# Patient Record
Sex: FEMALE | Race: WHITE | NOT HISPANIC OR LATINO | Employment: UNEMPLOYED | URBAN - METROPOLITAN AREA
[De-identification: names, ages, dates, MRNs, and addresses within clinical notes are randomized per-mention and may not be internally consistent; named-entity substitution may affect disease eponyms.]

---

## 2018-12-23 ENCOUNTER — APPOINTMENT (OUTPATIENT)
Dept: CARDIOLOGY | Facility: MEDICAL CENTER | Age: 48
End: 2018-12-23
Attending: INTERNAL MEDICINE

## 2018-12-23 ENCOUNTER — HOSPITAL ENCOUNTER (OUTPATIENT)
Facility: MEDICAL CENTER | Age: 48
End: 2018-12-24
Attending: EMERGENCY MEDICINE | Admitting: INTERNAL MEDICINE

## 2018-12-23 ENCOUNTER — APPOINTMENT (OUTPATIENT)
Dept: RADIOLOGY | Facility: MEDICAL CENTER | Age: 48
End: 2018-12-23
Attending: EMERGENCY MEDICINE

## 2018-12-23 DIAGNOSIS — I16.0 HYPERTENSIVE URGENCY: ICD-10-CM

## 2018-12-23 PROBLEM — G43.909 MIGRAINES: Status: ACTIVE | Noted: 2018-12-23

## 2018-12-23 LAB
ALBUMIN SERPL BCP-MCNC: 4.2 G/DL (ref 3.2–4.9)
ALBUMIN/GLOB SERPL: 1.3 G/DL
ALP SERPL-CCNC: 87 U/L (ref 30–99)
ALT SERPL-CCNC: 10 U/L (ref 2–50)
ANION GAP SERPL CALC-SCNC: 11 MMOL/L (ref 0–11.9)
APPEARANCE UR: CLEAR
APTT PPP: 24.9 SEC (ref 24.7–36)
AST SERPL-CCNC: 19 U/L (ref 12–45)
BACTERIA #/AREA URNS HPF: ABNORMAL /HPF
BASOPHILS # BLD AUTO: 0.4 % (ref 0–1.8)
BASOPHILS # BLD: 0.04 K/UL (ref 0–0.12)
BILIRUB SERPL-MCNC: 0.6 MG/DL (ref 0.1–1.5)
BILIRUB UR QL STRIP.AUTO: NEGATIVE
BUN SERPL-MCNC: 13 MG/DL (ref 8–22)
CALCIUM SERPL-MCNC: 9.2 MG/DL (ref 8.5–10.5)
CHLORIDE SERPL-SCNC: 108 MMOL/L (ref 96–112)
CO2 SERPL-SCNC: 21 MMOL/L (ref 20–33)
COLOR UR: YELLOW
CREAT SERPL-MCNC: 0.75 MG/DL (ref 0.5–1.4)
EOSINOPHIL # BLD AUTO: 0.11 K/UL (ref 0–0.51)
EOSINOPHIL NFR BLD: 1.1 % (ref 0–6.9)
EPI CELLS #/AREA URNS HPF: ABNORMAL /HPF
ERYTHROCYTE [DISTWIDTH] IN BLOOD BY AUTOMATED COUNT: 45.5 FL (ref 35.9–50)
GLOBULIN SER CALC-MCNC: 3.2 G/DL (ref 1.9–3.5)
GLUCOSE SERPL-MCNC: 102 MG/DL (ref 65–99)
GLUCOSE UR STRIP.AUTO-MCNC: NEGATIVE MG/DL
HCT VFR BLD AUTO: 47.6 % (ref 37–47)
HGB BLD-MCNC: 15.3 G/DL (ref 12–16)
HYALINE CASTS #/AREA URNS LPF: ABNORMAL /LPF
IMM GRANULOCYTES # BLD AUTO: 0.02 K/UL (ref 0–0.11)
IMM GRANULOCYTES NFR BLD AUTO: 0.2 % (ref 0–0.9)
INR PPP: 1.01 (ref 0.87–1.13)
KETONES UR STRIP.AUTO-MCNC: 15 MG/DL
LEUKOCYTE ESTERASE UR QL STRIP.AUTO: NEGATIVE
LV EJECT FRACT  99904: 60
LV EJECT FRACT MOD 2C 99903: 66.61
LV EJECT FRACT MOD 4C 99902: 54.45
LV EJECT FRACT MOD BP 99901: 61.62
LYMPHOCYTES # BLD AUTO: 1.48 K/UL (ref 1–4.8)
LYMPHOCYTES NFR BLD: 14.8 % (ref 22–41)
MAGNESIUM SERPL-MCNC: 2.2 MG/DL (ref 1.5–2.5)
MCH RBC QN AUTO: 31 PG (ref 27–33)
MCHC RBC AUTO-ENTMCNC: 32.1 G/DL (ref 33.6–35)
MCV RBC AUTO: 96.6 FL (ref 81.4–97.8)
MICRO URNS: ABNORMAL
MONOCYTES # BLD AUTO: 0.53 K/UL (ref 0–0.85)
MONOCYTES NFR BLD AUTO: 5.3 % (ref 0–13.4)
NEUTROPHILS # BLD AUTO: 7.79 K/UL (ref 2–7.15)
NEUTROPHILS NFR BLD: 78.2 % (ref 44–72)
NITRITE UR QL STRIP.AUTO: POSITIVE
NRBC # BLD AUTO: 0 K/UL
NRBC BLD-RTO: 0 /100 WBC
PH UR STRIP.AUTO: 5.5 [PH]
PLATELET # BLD AUTO: 324 K/UL (ref 164–446)
PMV BLD AUTO: 9.6 FL (ref 9–12.9)
POTASSIUM SERPL-SCNC: 3.9 MMOL/L (ref 3.6–5.5)
PROT SERPL-MCNC: 7.4 G/DL (ref 6–8.2)
PROT UR QL STRIP: NEGATIVE MG/DL
PROTHROMBIN TIME: 13.4 SEC (ref 12–14.6)
RBC # BLD AUTO: 4.93 M/UL (ref 4.2–5.4)
RBC # URNS HPF: ABNORMAL /HPF
RBC UR QL AUTO: ABNORMAL
SODIUM SERPL-SCNC: 140 MMOL/L (ref 135–145)
SP GR UR STRIP.AUTO: >=1.045
TROPONIN I SERPL-MCNC: <0.01 NG/ML (ref 0–0.04)
UROBILINOGEN UR STRIP.AUTO-MCNC: 1 MG/DL
WBC # BLD AUTO: 10 K/UL (ref 4.8–10.8)
WBC #/AREA URNS HPF: ABNORMAL /HPF

## 2018-12-23 PROCEDURE — 81001 URINALYSIS AUTO W/SCOPE: CPT

## 2018-12-23 PROCEDURE — 93306 TTE W/DOPPLER COMPLETE: CPT

## 2018-12-23 PROCEDURE — 83735 ASSAY OF MAGNESIUM: CPT

## 2018-12-23 PROCEDURE — 85025 COMPLETE CBC W/AUTO DIFF WBC: CPT

## 2018-12-23 PROCEDURE — 87186 SC STD MICRODIL/AGAR DIL: CPT

## 2018-12-23 PROCEDURE — 70496 CT ANGIOGRAPHY HEAD: CPT

## 2018-12-23 PROCEDURE — 700111 HCHG RX REV CODE 636 W/ 250 OVERRIDE (IP): Performed by: INTERNAL MEDICINE

## 2018-12-23 PROCEDURE — 87086 URINE CULTURE/COLONY COUNT: CPT

## 2018-12-23 PROCEDURE — 700111 HCHG RX REV CODE 636 W/ 250 OVERRIDE (IP): Performed by: EMERGENCY MEDICINE

## 2018-12-23 PROCEDURE — G0378 HOSPITAL OBSERVATION PER HR: HCPCS

## 2018-12-23 PROCEDURE — 85610 PROTHROMBIN TIME: CPT

## 2018-12-23 PROCEDURE — 93306 TTE W/DOPPLER COMPLETE: CPT | Mod: 26 | Performed by: INTERNAL MEDICINE

## 2018-12-23 PROCEDURE — 96365 THER/PROPH/DIAG IV INF INIT: CPT

## 2018-12-23 PROCEDURE — 99285 EMERGENCY DEPT VISIT HI MDM: CPT

## 2018-12-23 PROCEDURE — 99219 PR INITIAL OBSERVATION CARE,LEVL II: CPT | Performed by: INTERNAL MEDICINE

## 2018-12-23 PROCEDURE — 85730 THROMBOPLASTIN TIME PARTIAL: CPT

## 2018-12-23 PROCEDURE — 700105 HCHG RX REV CODE 258: Performed by: HOSPITALIST

## 2018-12-23 PROCEDURE — 700102 HCHG RX REV CODE 250 W/ 637 OVERRIDE(OP): Performed by: INTERNAL MEDICINE

## 2018-12-23 PROCEDURE — 84484 ASSAY OF TROPONIN QUANT: CPT

## 2018-12-23 PROCEDURE — 87077 CULTURE AEROBIC IDENTIFY: CPT

## 2018-12-23 PROCEDURE — 93005 ELECTROCARDIOGRAM TRACING: CPT | Performed by: EMERGENCY MEDICINE

## 2018-12-23 PROCEDURE — 700111 HCHG RX REV CODE 636 W/ 250 OVERRIDE (IP): Performed by: HOSPITALIST

## 2018-12-23 PROCEDURE — 96375 TX/PRO/DX INJ NEW DRUG ADDON: CPT

## 2018-12-23 PROCEDURE — 700117 HCHG RX CONTRAST REV CODE 255: Performed by: EMERGENCY MEDICINE

## 2018-12-23 PROCEDURE — 80053 COMPREHEN METABOLIC PANEL: CPT

## 2018-12-23 PROCEDURE — A9270 NON-COVERED ITEM OR SERVICE: HCPCS | Performed by: INTERNAL MEDICINE

## 2018-12-23 RX ORDER — KETOROLAC TROMETHAMINE 30 MG/ML
30 INJECTION, SOLUTION INTRAMUSCULAR; INTRAVENOUS EVERY 6 HOURS PRN
Status: DISCONTINUED | OUTPATIENT
Start: 2018-12-23 | End: 2018-12-24 | Stop reason: HOSPADM

## 2018-12-23 RX ORDER — ENALAPRILAT 1.25 MG/ML
1.25 INJECTION INTRAVENOUS EVERY 6 HOURS PRN
Status: DISCONTINUED | OUTPATIENT
Start: 2018-12-23 | End: 2018-12-24 | Stop reason: HOSPADM

## 2018-12-23 RX ORDER — BISACODYL 10 MG
10 SUPPOSITORY, RECTAL RECTAL
Status: DISCONTINUED | OUTPATIENT
Start: 2018-12-23 | End: 2018-12-24 | Stop reason: HOSPADM

## 2018-12-23 RX ORDER — AMOXICILLIN 250 MG
2 CAPSULE ORAL 2 TIMES DAILY
Status: DISCONTINUED | OUTPATIENT
Start: 2018-12-23 | End: 2018-12-24 | Stop reason: HOSPADM

## 2018-12-23 RX ORDER — PROMETHAZINE HYDROCHLORIDE 12.5 MG/1
12.5-25 SUPPOSITORY RECTAL EVERY 4 HOURS PRN
Status: DISCONTINUED | OUTPATIENT
Start: 2018-12-23 | End: 2018-12-24 | Stop reason: HOSPADM

## 2018-12-23 RX ORDER — HYDRALAZINE HYDROCHLORIDE 20 MG/ML
10 INJECTION INTRAMUSCULAR; INTRAVENOUS EVERY 4 HOURS PRN
Status: DISCONTINUED | OUTPATIENT
Start: 2018-12-23 | End: 2018-12-24 | Stop reason: HOSPADM

## 2018-12-23 RX ORDER — CLONIDINE HYDROCHLORIDE 0.1 MG/1
0.1 TABLET ORAL EVERY 6 HOURS PRN
Status: DISCONTINUED | OUTPATIENT
Start: 2018-12-23 | End: 2018-12-24 | Stop reason: HOSPADM

## 2018-12-23 RX ORDER — POLYETHYLENE GLYCOL 3350 17 G/17G
1 POWDER, FOR SOLUTION ORAL
Status: DISCONTINUED | OUTPATIENT
Start: 2018-12-23 | End: 2018-12-24 | Stop reason: HOSPADM

## 2018-12-23 RX ORDER — ALPRAZOLAM 0.5 MG/1
.25-.5 TABLET ORAL PRN
Status: SHIPPED | COMMUNITY
End: 2023-07-09

## 2018-12-23 RX ORDER — ONDANSETRON 4 MG/1
4 TABLET, ORALLY DISINTEGRATING ORAL EVERY 4 HOURS PRN
Status: DISCONTINUED | OUTPATIENT
Start: 2018-12-23 | End: 2018-12-23

## 2018-12-23 RX ORDER — ONDANSETRON 2 MG/ML
4 INJECTION INTRAMUSCULAR; INTRAVENOUS EVERY 4 HOURS PRN
Status: DISCONTINUED | OUTPATIENT
Start: 2018-12-23 | End: 2018-12-23

## 2018-12-23 RX ORDER — PROMETHAZINE HYDROCHLORIDE 25 MG/1
12.5-25 TABLET ORAL EVERY 4 HOURS PRN
Status: DISCONTINUED | OUTPATIENT
Start: 2018-12-23 | End: 2018-12-24 | Stop reason: HOSPADM

## 2018-12-23 RX ORDER — ACETAMINOPHEN 325 MG/1
650 TABLET ORAL EVERY 6 HOURS PRN
Status: DISCONTINUED | OUTPATIENT
Start: 2018-12-23 | End: 2018-12-24 | Stop reason: HOSPADM

## 2018-12-23 RX ORDER — LISINOPRIL 10 MG/1
10 TABLET ORAL
Status: DISCONTINUED | OUTPATIENT
Start: 2018-12-23 | End: 2018-12-24 | Stop reason: HOSPADM

## 2018-12-23 RX ADMIN — IOHEXOL 100 ML: 350 INJECTION, SOLUTION INTRAVENOUS at 15:47

## 2018-12-23 RX ADMIN — CEFTRIAXONE SODIUM 1 G: 1 INJECTION, POWDER, FOR SOLUTION INTRAMUSCULAR; INTRAVENOUS at 22:45

## 2018-12-23 RX ADMIN — LISINOPRIL 10 MG: 10 TABLET ORAL at 16:41

## 2018-12-23 RX ADMIN — PROCHLORPERAZINE EDISYLATE 10 MG: 5 INJECTION INTRAMUSCULAR; INTRAVENOUS at 14:00

## 2018-12-23 RX ADMIN — ENALAPRILAT 1.25 MG: 1.25 INJECTION INTRAVENOUS at 16:18

## 2018-12-23 RX ADMIN — HYDRALAZINE HYDROCHLORIDE 10 MG: 20 INJECTION INTRAMUSCULAR; INTRAVENOUS at 17:48

## 2018-12-23 RX ADMIN — KETOROLAC TROMETHAMINE 30 MG: 30 INJECTION, SOLUTION INTRAMUSCULAR at 17:06

## 2018-12-23 ASSESSMENT — LIFESTYLE VARIABLES
AVERAGE NUMBER OF DAYS PER WEEK YOU HAVE A DRINK CONTAINING ALCOHOL: 7
HOW MANY TIMES IN THE PAST YEAR HAVE YOU HAD 5 OR MORE DRINKS IN A DAY: 0
TOTAL SCORE: 0
HAVE YOU EVER FELT YOU SHOULD CUT DOWN ON YOUR DRINKING: NO
ALCOHOL_USE: YES
TOTAL SCORE: 0
TOTAL SCORE: 0
CONSUMPTION TOTAL: POSITIVE
EVER HAD A DRINK FIRST THING IN THE MORNING TO STEADY YOUR NERVES TO GET RID OF A HANGOVER: NO
EVER FELT BAD OR GUILTY ABOUT YOUR DRINKING: NO
EVER_SMOKED: YES
ON A TYPICAL DAY WHEN YOU DRINK ALCOHOL HOW MANY DRINKS DO YOU HAVE: 3
HAVE PEOPLE ANNOYED YOU BY CRITICIZING YOUR DRINKING: NO

## 2018-12-23 ASSESSMENT — PAIN SCALES - GENERAL
PAINLEVEL_OUTOF10: 5
PAINLEVEL_OUTOF10: 0
PAINLEVEL_OUTOF10: 9
PAINLEVEL_OUTOF10: 0

## 2018-12-23 ASSESSMENT — PATIENT HEALTH QUESTIONNAIRE - PHQ9
1. LITTLE INTEREST OR PLEASURE IN DOING THINGS: NOT AT ALL
2. FEELING DOWN, DEPRESSED, IRRITABLE, OR HOPELESS: NOT AT ALL
SUM OF ALL RESPONSES TO PHQ9 QUESTIONS 1 AND 2: 0

## 2018-12-23 NOTE — ED TRIAGE NOTES
"Chief Complaint   Patient presents with   • Headache     since last night, getting worse today   • N/V     today      Pt ambulatory to triage for above. PERRLA. Hx of migraines. BP noted.   Pt returned to [facility]. Educated on triage process and to inform staff of any changes.   BP (!) 195/100   Pulse 71   Temp 36.4 °C (97.5 °F) (Temporal)   Resp 16   Ht 1.549 m (5' 1\")   Wt 56.1 kg (123 lb 10.9 oz)   LMP 09/21/2015 (Approximate)   SpO2 99%   BMI 23.37 kg/m²     "

## 2018-12-23 NOTE — ED PROVIDER NOTES
ED Provider Note    Scribed for Leeann Padron M.D. by Medardo Patrick. 12/23/2018, 1:29 PM.    Primary care provider: Yeyo Bravo M.D.  Means of arrival: Walk in  History obtained from: Patient  History limited by: None    CHIEF COMPLAINT  Chief Complaint   Patient presents with   • Headache     since last night, getting worse today   • N/V     today        HPI  Yolie Elliott is a 48 y.o. female who presents to the Emergency Department for evaluation of headache onset last night per RN note, that has progressively worsened2. The patient endorses emesis, numbness of the fingers, palpitations, and lightheadedness, but denies any confusion, difficulty moving extremities, fever, chest pain, shortness of breath, dysuria, and back pain. The patient reports that she normally develops a bad headache once a month and she will wake up with the headaches. This headache is similar to when she develops a bad headache normally. She has never undergone any head imaging or been diagnosed with migraines. She has also not undergone blood work recently. The patient reports that she took her blood pressure over the past three days and it was consistently around 180/105, but today she notes that her systolic pressure was 209. She was previously evaluated for hypertension, but she reports that her MD noted that her high blood pressure was most likely secondary to anxiety. She also confirms a current bladder infection, that she reports she has had for over a year. The patient denies any history of aneurysm.    REVIEW OF SYSTEMS  Pertinent positives include headache, emesis, numbness of the fingers, palpitations, and lightheadedness. Pertinent negatives include no confusion, difficulty moving extremities, fever, chest pain, shortness of breath, dysuria, and back pain. All other systems reviewed and negative.     PAST MEDICAL HISTORY   has a past medical history of Arrhythmia; Breath shortness; Dental disorder;  "Heart burn; Hypertension; Inguinal hernia; Migraines; Other specified symptom associated with female genital organs; Ovarian cyst rupture; and Urinary bladder disorder.    SURGICAL HISTORY   has a past surgical history that includes pelviscopy (2/15/2011); dilation and curettage (2/15/2011); other abdominal surgery (2008); other; and abdominal hysterectomy total (N/A, 10/12/2015).    SOCIAL HISTORY  Social History   Substance Use Topics   • Smoking status: Former Smoker     Packs/day: 1.00     Years: 15.00     Types: Cigarettes   • Smokeless tobacco: Never Used      Comment: pack a day   • Alcohol use Yes      Comment: a couple a day      History   Drug Use No       FAMILY HISTORY  History reviewed. No pertinent family history.    CURRENT MEDICATIONS  Home Medications     Reviewed by Trav White R.N. (Registered Nurse) on 12/23/18 at 125wiMAN  Med List Status: Partial   Medication Last Dose Status   ALPRAZolam (XANAX PO)  Active   FIBER SELECT GUMMIES Chew Tab  Active   ibuprofen (ADVIL) 200 MG Tab  Active   ibuprofen (MOTRIN) 600 MG Tab  Active   oxycodone-acetaminophen (PERCOCET) 5-325 MG Tab  Active                ALLERGIES  No Known Allergies    PHYSICAL EXAM  VITAL SIGNS: BP (!) 195/100   Pulse 71   Temp 36.4 °C (97.5 °F) (Temporal)   Resp 16   Ht 1.549 m (5' 1\")   Wt 56.1 kg (123 lb 10.9 oz)   LMP 09/21/2015 (Approximate)   SpO2 99%   BMI 23.37 kg/m²     Constitutional:  Lying in bed with a sweatshirt over her eyes , able to answer questions  HENT: Nose is normal in appearance without rhinorrhea, external ears are normal,  moist mucous membranes  Eyes: Anicteric,  pupils are equal round and reactive, there is no conjunctival drainage or pallor   Neck: The trachea is midline, there is no obvious mass or meningeal signs  Cardiovascular: Equal radial pulsation, regular rate and rhythm without murmurs gallops or rubs  Thorax & Lungs: Respiratory rate and effort are normal. There is normal chest excursion " with respiration. No wheezes rhonchi or rales noted.  Abdomen: Abdomen is normal in appearance  :  No CVA tenderness to palpation  Musculoskeletal: No deformities noted in all 4 extremities. Actively moves all 4 extremities  Skin: Visualized skin is warm, no erythema, no rash.  Neurologic:  Cranial nerves II through XII are intact there is no focal abnormality noted.  Psychiatric: Normal mood and mentation    DIAGNOSTIC STUDIES / PROCEDURES    LABS  Results for orders placed or performed during the hospital encounter of 12/23/18   CBC WITH DIFFERENTIAL   Result Value Ref Range    WBC 10.0 4.8 - 10.8 K/uL    RBC 4.93 4.20 - 5.40 M/uL    Hemoglobin 15.3 12.0 - 16.0 g/dL    Hematocrit 47.6 (H) 37.0 - 47.0 %    MCV 96.6 81.4 - 97.8 fL    MCH 31.0 27.0 - 33.0 pg    MCHC 32.1 (L) 33.6 - 35.0 g/dL    RDW 45.5 35.9 - 50.0 fL    Platelet Count 324 164 - 446 K/uL    MPV 9.6 9.0 - 12.9 fL    Neutrophils-Polys 78.20 (H) 44.00 - 72.00 %    Lymphocytes 14.80 (L) 22.00 - 41.00 %    Monocytes 5.30 0.00 - 13.40 %    Eosinophils 1.10 0.00 - 6.90 %    Basophils 0.40 0.00 - 1.80 %    Immature Granulocytes 0.20 0.00 - 0.90 %    Nucleated RBC 0.00 /100 WBC    Neutrophils (Absolute) 7.79 (H) 2.00 - 7.15 K/uL    Lymphs (Absolute) 1.48 1.00 - 4.80 K/uL    Monos (Absolute) 0.53 0.00 - 0.85 K/uL    Eos (Absolute) 0.11 0.00 - 0.51 K/uL    Baso (Absolute) 0.04 0.00 - 0.12 K/uL    Immature Granulocytes (abs) 0.02 0.00 - 0.11 K/uL    NRBC (Absolute) 0.00 K/uL   COMP METABOLIC PANEL   Result Value Ref Range    Sodium 140 135 - 145 mmol/L    Potassium 3.9 3.6 - 5.5 mmol/L    Chloride 108 96 - 112 mmol/L    Co2 21 20 - 33 mmol/L    Anion Gap 11.0 0.0 - 11.9    Glucose 102 (H) 65 - 99 mg/dL    Bun 13 8 - 22 mg/dL    Creatinine 0.75 0.50 - 1.40 mg/dL    Calcium 9.2 8.5 - 10.5 mg/dL    AST(SGOT) 19 12 - 45 U/L    ALT(SGPT) 10 2 - 50 U/L    Alkaline Phosphatase 87 30 - 99 U/L    Total Bilirubin 0.6 0.1 - 1.5 mg/dL    Albumin 4.2 3.2 - 4.9 g/dL     Total Protein 7.4 6.0 - 8.2 g/dL    Globulin 3.2 1.9 - 3.5 g/dL    A-G Ratio 1.3 g/dL   TROPONIN   Result Value Ref Range    Troponin I <0.01 0.00 - 0.04 ng/mL   PROTHROMBIN TIME   Result Value Ref Range    PT 13.4 12.0 - 14.6 sec    INR 1.01 0.87 - 1.13   APTT   Result Value Ref Range    APTT 24.9 24.7 - 36.0 sec   ESTIMATED GFR   Result Value Ref Range    GFR If African American >60 >60 mL/min/1.73 m 2    GFR If Non African American >60 >60 mL/min/1.73 m 2   EKG   Result Value Ref Range    Report       [facility] Emergency Dept.    Test Date:  2018  Pt Name:    JOSUE PATTEN                Department: ER  MRN:        [id number]                      Room:       [facility]  Gender:     Female                       Technician: [id number]  :        1970                   Requested By:JENNIFER MCGOVERN  Order #:    [id number]                    Reading MD:    Measurements  Intervals                                Axis  Rate:       60                           P:          64  WY:         152                          QRS:        -2  QRSD:       88                           T:          40  QT:         516  QTc:        516    Interpretive Statements  SINUS RHYTHM  CONSIDER LEFT VENTRICULAR HYPERTROPHY  PROLONGED QT INTERVAL  Compared to ECG 10/05/2015 09:35:06  Prolonged QT interval now present       All labs reviewed by me.    EKG  I interpreted this EKG myself.  This is a 12-lead study.  The rhythm is sinus with a rate of 60. QRS of 88, LVH  appreciated. There are no ST segment nor T wave abnormalities.  Interpretation: No ST segment elevation myocardial infarction or arrhythmia. LVH new from prior EKG in .    RADIOLOGY  CT-CTA HEAD WITH & W/O-POST PROCESS   Final Result         1. No hemodynamically significant narrowing of the major intracranial vessels.        The radiologist's interpretation of all radiological studies have been reviewed by me.    COURSE & MEDICAL DECISION MAKING  Nursing  "notes and vital signs were reviewed. (See chart for details)  The patient's  records were reviewed, history was obtained from the patient.    The patient presents with headache, and the differential diagnosis includes but is not limited to acute headache that may be due to her chronic migraine, CT-CTA will be ordered to rule out intracranial process, mass and subarachnoid hemmorhage.       1:29 PM Initial orders in the Emergency Department included CT-CTA head, CBC, CMP, Troponin, Prothrombin time, APTT, UA, and EKG and initial treatment in the Emergency Department included Compazine 10 mg. I would like a CT with contrast of the brain to evaluate. She will also receive pain medications and blood pressure screening tests.    ED testing reveals LVH otherwise negative work-up    4:01 PM I reevaluated the patient and she was found to have an improved headache after receiving Compazine. I informed the patient that there CT and blood work appear to be normal, however, her EKG demonstrates Hypertrophy. She will need to be admitted for evaluation. The patient understands and agrees to admission.    BP (!) 195/100   Pulse 69   Temp 36.4 °C (97.5 °F) (Temporal)   Resp 14   Ht 1.549 m (5' 1\")   Wt 56.1 kg (123 lb 10.9 oz)   LMP 09/21/2015 (Approximate)   SpO2 99%   BMI 23.37 kg/m²     3:58 PM Hospitalist paged.    4:02 PM Dr. Dunham, Hospitalist, consulted and agrees to admit the patient.    DISPOSITION:  Patient will be admitted to Dr. Dunham in guarded condition.    FINAL IMPRESSION  1. Hypertensive urgency         Medardo FERREIRA ([place]), am scribing for, and in the presence of, Leeann Padron M.D.     Electronically signed by: Medardo Patrick ([email or web address]), 12/23//2018     Leeann FERREIRA M.D. personally performed the services described in this documentation, as scribed by Medardo Patrick in my presence, and it is both accurate and complete. C    The note accurately reflects work and decisions made by me. "  Leeann Padron  12/23/2018  6:19 PM

## 2018-12-23 NOTE — ED NOTES
Pt reports headache starting behind her eyes.  Pt reports HTN at home with SBP around 210 this morning at home.  Pt reports HTN at her PCP office who thought it was anxiety related and did not f/u on.  Pt reports N/V today and photophobia.

## 2018-12-24 ENCOUNTER — PATIENT OUTREACH (OUTPATIENT)
Dept: HEALTH INFORMATION MANAGEMENT | Facility: OTHER | Age: 48
End: 2018-12-24

## 2018-12-24 VITALS
HEIGHT: 61 IN | SYSTOLIC BLOOD PRESSURE: 158 MMHG | BODY MASS INDEX: 23.35 KG/M2 | WEIGHT: 123.68 LBS | HEART RATE: 76 BPM | RESPIRATION RATE: 16 BRPM | DIASTOLIC BLOOD PRESSURE: 80 MMHG | OXYGEN SATURATION: 97 % | TEMPERATURE: 99.1 F

## 2018-12-24 PROBLEM — I10 ESSENTIAL HYPERTENSION: Status: ACTIVE | Noted: 2018-12-24

## 2018-12-24 PROBLEM — E87.6 HYPOKALEMIA: Status: ACTIVE | Noted: 2018-12-24

## 2018-12-24 PROBLEM — N39.0 UTI (URINARY TRACT INFECTION): Status: ACTIVE | Noted: 2018-12-24

## 2018-12-24 PROBLEM — I16.0 HYPERTENSIVE URGENCY: Status: RESOLVED | Noted: 2018-12-23 | Resolved: 2018-12-24

## 2018-12-24 LAB
ANION GAP SERPL CALC-SCNC: 10 MMOL/L (ref 0–11.9)
BASOPHILS # BLD AUTO: 0.4 % (ref 0–1.8)
BASOPHILS # BLD: 0.04 K/UL (ref 0–0.12)
BUN SERPL-MCNC: 14 MG/DL (ref 8–22)
CALCIUM SERPL-MCNC: 8.9 MG/DL (ref 8.5–10.5)
CHLORIDE SERPL-SCNC: 105 MMOL/L (ref 96–112)
CO2 SERPL-SCNC: 20 MMOL/L (ref 20–33)
CREAT SERPL-MCNC: 0.75 MG/DL (ref 0.5–1.4)
EOSINOPHIL # BLD AUTO: 0.18 K/UL (ref 0–0.51)
EOSINOPHIL NFR BLD: 1.7 % (ref 0–6.9)
ERYTHROCYTE [DISTWIDTH] IN BLOOD BY AUTOMATED COUNT: 44.6 FL (ref 35.9–50)
GLUCOSE SERPL-MCNC: 104 MG/DL (ref 65–99)
HCT VFR BLD AUTO: 43.4 % (ref 37–47)
HGB BLD-MCNC: 14.1 G/DL (ref 12–16)
IMM GRANULOCYTES # BLD AUTO: 0.03 K/UL (ref 0–0.11)
IMM GRANULOCYTES NFR BLD AUTO: 0.3 % (ref 0–0.9)
LYMPHOCYTES # BLD AUTO: 2.64 K/UL (ref 1–4.8)
LYMPHOCYTES NFR BLD: 24.4 % (ref 22–41)
MCH RBC QN AUTO: 30.8 PG (ref 27–33)
MCHC RBC AUTO-ENTMCNC: 32.5 G/DL (ref 33.6–35)
MCV RBC AUTO: 94.8 FL (ref 81.4–97.8)
MONOCYTES # BLD AUTO: 0.9 K/UL (ref 0–0.85)
MONOCYTES NFR BLD AUTO: 8.3 % (ref 0–13.4)
NEUTROPHILS # BLD AUTO: 7.01 K/UL (ref 2–7.15)
NEUTROPHILS NFR BLD: 64.9 % (ref 44–72)
NRBC # BLD AUTO: 0 K/UL
NRBC BLD-RTO: 0 /100 WBC
PLATELET # BLD AUTO: 322 K/UL (ref 164–446)
PMV BLD AUTO: 9.8 FL (ref 9–12.9)
POTASSIUM SERPL-SCNC: 3.4 MMOL/L (ref 3.6–5.5)
RBC # BLD AUTO: 4.58 M/UL (ref 4.2–5.4)
SODIUM SERPL-SCNC: 135 MMOL/L (ref 135–145)
WBC # BLD AUTO: 10.8 K/UL (ref 4.8–10.8)

## 2018-12-24 PROCEDURE — 96376 TX/PRO/DX INJ SAME DRUG ADON: CPT

## 2018-12-24 PROCEDURE — 80048 BASIC METABOLIC PNL TOTAL CA: CPT

## 2018-12-24 PROCEDURE — 85025 COMPLETE CBC W/AUTO DIFF WBC: CPT

## 2018-12-24 PROCEDURE — G0378 HOSPITAL OBSERVATION PER HR: HCPCS

## 2018-12-24 PROCEDURE — 700111 HCHG RX REV CODE 636 W/ 250 OVERRIDE (IP): Performed by: INTERNAL MEDICINE

## 2018-12-24 PROCEDURE — 99217 PR OBSERVATION CARE DISCHARGE: CPT | Performed by: INTERNAL MEDICINE

## 2018-12-24 PROCEDURE — A9270 NON-COVERED ITEM OR SERVICE: HCPCS | Performed by: INTERNAL MEDICINE

## 2018-12-24 PROCEDURE — 700102 HCHG RX REV CODE 250 W/ 637 OVERRIDE(OP): Performed by: INTERNAL MEDICINE

## 2018-12-24 RX ORDER — CEFDINIR 300 MG/1
300 CAPSULE ORAL 2 TIMES DAILY
Qty: 6 CAP | Refills: 0 | Status: SHIPPED | OUTPATIENT
Start: 2018-12-24 | End: 2018-12-27

## 2018-12-24 RX ORDER — LISINOPRIL 10 MG/1
10 TABLET ORAL DAILY
Qty: 30 TAB | Refills: 1 | Status: SHIPPED | OUTPATIENT
Start: 2018-12-25 | End: 2022-02-17 | Stop reason: CLARIF

## 2018-12-24 RX ORDER — POTASSIUM CHLORIDE 20 MEQ/1
40 TABLET, EXTENDED RELEASE ORAL ONCE
Status: COMPLETED | OUTPATIENT
Start: 2018-12-24 | End: 2018-12-24

## 2018-12-24 RX ADMIN — KETOROLAC TROMETHAMINE 30 MG: 30 INJECTION, SOLUTION INTRAMUSCULAR at 08:05

## 2018-12-24 RX ADMIN — KETOROLAC TROMETHAMINE 30 MG: 30 INJECTION, SOLUTION INTRAMUSCULAR at 01:04

## 2018-12-24 RX ADMIN — POTASSIUM CHLORIDE 40 MEQ: 1500 TABLET, EXTENDED RELEASE ORAL at 08:05

## 2018-12-24 RX ADMIN — ACETAMINOPHEN 650 MG: 325 TABLET, FILM COATED ORAL at 05:26

## 2018-12-24 RX ADMIN — LISINOPRIL 10 MG: 10 TABLET ORAL at 05:24

## 2018-12-24 ASSESSMENT — PATIENT HEALTH QUESTIONNAIRE - PHQ9
2. FEELING DOWN, DEPRESSED, IRRITABLE, OR HOPELESS: NOT AT ALL
SUM OF ALL RESPONSES TO PHQ9 QUESTIONS 1 AND 2: 0
1. LITTLE INTEREST OR PLEASURE IN DOING THINGS: NOT AT ALL

## 2018-12-24 ASSESSMENT — COPD QUESTIONNAIRES
COPD SCREENING SCORE: 3
DO YOU EVER COUGH UP ANY MUCUS OR PHLEGM?: NO/ONLY WITH OCCASIONAL COLDS OR INFECTIONS
DURING THE PAST 4 WEEKS HOW MUCH DID YOU FEEL SHORT OF BREATH: SOME OF THE TIME
HAVE YOU SMOKED AT LEAST 100 CIGARETTES IN YOUR ENTIRE LIFE: YES

## 2018-12-24 ASSESSMENT — PAIN SCALES - GENERAL
PAINLEVEL_OUTOF10: 0
PAINLEVEL_OUTOF10: 0
PAINLEVEL_OUTOF10: 6
PAINLEVEL_OUTOF10: 3
PAINLEVEL_OUTOF10: 4

## 2018-12-24 ASSESSMENT — LIFESTYLE VARIABLES: EVER_SMOKED: YES

## 2018-12-24 NOTE — PROGRESS NOTES
Ambulated to bathroom with steady gait. Denies pain med needs right now. , HR 80-90's. Eating dinner and tolerating well. Plan of care reviewed including pending echo results, labs and vital frequency. Verbalized understanding and agrees. Able to make needs known.

## 2018-12-24 NOTE — DISCHARGE INSTRUCTIONS
Discharge Instructions    Discharged to home by car with relative. Discharged via walking, hospital escort: Yes.  Special equipment needed: Not Applicable    Be sure to schedule a follow-up appointment with your primary care doctor or any specialists as instructed.     Discharge Plan:   Diet Plan: Discussed  Activity Level: Discussed  Confirmed Follow up Appointment: Patient to Call and Schedule Appointment  Confirmed Symptoms Management: Discussed  Medication Reconciliation Updated: Yes  Influenza Vaccine Indication: Patient Refuses    I understand that a diet low in cholesterol, fat, and sodium is recommended for good health. Unless I have been given specific instructions below for another diet, I accept this instruction as my diet prescription.   Other diet: reg    Special Instructions: None    · Is patient discharged on Warfarin / Coumadin?   No     Depression / Suicide Risk    As you are discharged from this [facility] facility, it is important to learn how to keep safe from harming yourself.    Recognize the warning signs:  · Abrupt changes in personality, positive or negative- including increase in energy   · Giving away possessions  · Change in eating patterns- significant weight changes-  positive or negative  · Change in sleeping patterns- unable to sleep or sleeping all the time   · Unwillingness or inability to communicate  · Depression  · Unusual sadness, discouragement and loneliness  · Talk of wanting to die  · Neglect of personal appearance   · Rebelliousness- reckless behavior  · Withdrawal from people/activities they love  · Confusion- inability to concentrate     If you or a loved one observes any of these behaviors or has concerns about self-harm, here's what you can do:  · Talk about it- your feelings and reasons for harming yourself  · Remove any means that you might use to hurt yourself (examples: pills, rope, extension cords, firearm)  · Get professional help from the community (Mental  Health, Substance Abuse, psychological counseling)  · Do not be alone:Call your Safe Contact- someone whom you trust who will be there for you.  · Call your local CRISIS HOTLINE 593-6476 or 685-283-9431  · Call your local Children's Mobile Crisis Response Team [place] (335) 572-0273 or www.OROS  · Call the toll free National Suicide Prevention Hotlines   · National Suicide Prevention Lifeline 509-506-YRVE (0698)  · National Hope Line Network 800-SUICIDE (846-4980)

## 2018-12-24 NOTE — ASSESSMENT & PLAN NOTE
- I will start her on lisinopril 10mg daily. Monitor BP trend closely, as may need further optimization of medications. Start PRN PO clonidine, IV vasotec, and hydralazine for significant hypertension.  - for completion, I will obtain an echocardiogram to evaluate for LVH.  - monitor on telemetry.

## 2018-12-24 NOTE — PROGRESS NOTES
Pt arrived to unit via WC on Zoll monitor with RN at 1625. Pt oriented to room, unit, and plan of care. Tele-monitor placed. All questions answered at this time. Call light within reach, fall precautions in place, will continue to monitor.

## 2018-12-24 NOTE — H&P
"Hospital Medicine History & Physical Note    Date of Service  12/23/2018    Primary Care Physician  Yeyo Bravo M.D.    Consultants  None    Code Status  Full    Chief Complaint  Headache    History of Presenting Illness  48 y.o. female with hypertension, and migraine headaches, who presented 12/23/2018 with headache, and elevated blood pressure.    She states she usually gets migraine once a month, which occurred last night when she had an onset of headache which she further described as \"enough to be bothersome\", located behind the eyes and temple, and further described as throbbing, and pulsating, and pressure and sharp.  It was associated with nausea and vomiting, and sensitivity to the light.  She checked her blood pressure which was 209/109, and it was persisted.  She spoke to her sister who works at [employer], who encouraged her to seek medical attention and hence she went to the ED.    She does not have any other symptoms such as chest pain, shortness of breath, although she did feel some fluttering sensation on her chest.  She did not have any fevers, chills, abdominal pain, or bowel movement changes.  She did not have any dysuria.  Notably, she saw her primary doctor last year, already noticed her elevated blood pressure, but felt that the high blood pressure was related to anxiety, and she was not started on any antihypertensives.    ED course:  The patient was initially evaluated in the emergency department, and noted to have significantly elevated blood pressure of 201/90.  Initial blood workup were unremarkable with unimpressive CBC, CMP, negative troponin.  CT angiogram of the head did not show any significant hemodynamic narrowing of the major intracranial vessels, with no acute intracranial hemorrhage.  EKG (personally reviewed) showed normal sinus rhythm, with LVH, prolonged QT interval.  She was given Compazine, which improved her headache.  She was subsequently admitted to the hospital " service.    Review of Systems  ROS     Pertinent positives/negatives as mentioned above.     A complete review of systems was personally done by me. All other systems were negative.       Past Medical History   has a past medical history of Arrhythmia; Breath shortness; Dental disorder; Heart burn; Hypertension; Inguinal hernia; Migraines; Other specified symptom associated with female genital organs; Ovarian cyst rupture; and Urinary bladder disorder.    Surgical History   has a past surgical history that includes pelviscopy (2/15/2011); dilation and curettage (2/15/2011); other abdominal surgery (2008); other; and abdominal hysterectomy total (N/A, 10/12/2015).     Family History  Reviewed. Non-contributory.      Social History   reports that she has quit smoking. Her smoking use included Cigarettes. She has a 15.00 pack-year smoking history. She has never used smokeless tobacco. She reports that she drinks alcohol. She reports that she does not use drugs.    Allergies  No Known Allergies    Medications  None       Physical Exam  Temp:  [36.4 °C (97.5 °F)] 36.4 °C (97.5 °F)  Pulse:  [62-80] 69  Resp:  [13-16] 14  BP: (195)/(100) 195/100    Physical Exam   Constitutional: She is oriented to person, place, and time. She appears well-developed and well-nourished. No distress.   HENT:   Head: Normocephalic and atraumatic.   Mouth/Throat: Oropharynx is clear and moist. No oropharyngeal exudate.   No temporal tenderness   Eyes: Pupils are equal, round, and reactive to light. Conjunctivae are normal. No scleral icterus.   Neck: Normal range of motion. Neck supple.   Cardiovascular: Normal rate and regular rhythm.  Exam reveals no gallop and no friction rub.    No murmur heard.  Pulmonary/Chest: Effort normal and breath sounds normal. No respiratory distress. She has no wheezes. She has no rales. She exhibits no tenderness.   Abdominal: Soft. Bowel sounds are normal. She exhibits no distension. There is no tenderness.  There is no rebound and no guarding.   Musculoskeletal: She exhibits no edema or tenderness.   Lymphadenopathy:     She has no cervical adenopathy.   Neurological: She is alert and oriented to person, place, and time. No cranial nerve deficit.   Skin: Skin is warm and dry. No rash noted. She is not diaphoretic. No erythema. No pallor.   Psychiatric: She has a normal mood and affect. Her behavior is normal. Judgment and thought content normal.   Nursing note and vitals reviewed.      Laboratory:  Recent Labs      12/23/18   1334   WBC  10.0   RBC  4.93   HEMOGLOBIN  15.3   HEMATOCRIT  47.6*   MCV  96.6   MCH  31.0   MCHC  32.1*   RDW  45.5   PLATELETCT  324   MPV  9.6     Recent Labs      12/23/18   1334   SODIUM  140   POTASSIUM  3.9   CHLORIDE  108   CO2  21   GLUCOSE  102*   BUN  13   CREATININE  0.75   CALCIUM  9.2     Recent Labs      12/23/18   1334   ALTSGPT  10   ASTSGOT  19   ALKPHOSPHAT  87   TBILIRUBIN  0.6   GLUCOSE  102*     Recent Labs      12/23/18   1334   APTT  24.9   INR  1.01             Recent Labs      12/23/18   1334   TROPONINI  <0.01       Urinalysis:    No results found     Imaging:  CT-CTA HEAD WITH & W/O-POST PROCESS   Final Result         1. No hemodynamically significant narrowing of the major intracranial vessels.      EC-ECHOCARDIOGRAM COMPLETE W/ CONT    (Results Pending)         Assessment/Plan:  I anticipate this patient is appropriate for observation status at this time.    * Hypertensive urgency- (present on admission)   Assessment & Plan    - I will start her on lisinopril 10mg daily. Monitor BP trend closely, as may need further optimization of medications. Start PRN PO clonidine, IV vasotec, and hydralazine for significant hypertension.  - for completion, I will obtain an echocardiogram to evaluate for LVH.  - monitor on telemetry.     Migraines- (present on admission)   Assessment & Plan    - Start PRN IV toradol, along with PRN antiemetics.          VTE prophylaxis: SCD

## 2018-12-24 NOTE — ED NOTES
CDU RN at bedside to transport pt.  Pt report given.  VS taken, /88.  Pt given 1.25mg vasotec IVP.  CDU RN states he is comfortable transporting pt to unit at this time.

## 2018-12-24 NOTE — DISCHARGE SUMMARY
Discharge Summary    CHIEF COMPLAINT ON ADMISSION  Chief Complaint   Patient presents with   • Headache     since last night, getting worse today   • N/V     today        Reason for Admission  Headache     Admission Date  12/23/2018    CODE STATUS  Full Code    HPI & HOSPITAL COURSE  This is a 48 y.o. female with hypertension, and migraine headaches, who presented 12/23/2018 with headache, and elevated blood pressure. She was initially evaluated, and was noted to have significantly elevated blood pressure of 201/90.  Initial blood workup were unremarkable with unimpressive CBC, CMP, negative troponin.  CT angiogram of the head did not show any significant hemodynamic narrowing of the major intracranial vessels, with no acute intracranial hemorrhage.  EKG showed normal sinus rhythm, with LVH, prolonged QT interval.  She was given Compazine and analgesics, which improved her headache.   She was started on lisinopril, which improved her blood pressure control.  She did have low potassium which was replaced. Urinalysis showed 10-20 WBC, with positive nitrite, negative leukocyte esterase, with many bacteria, and with slight dysuria, she was started on antibiotics.  Echocardiogram showed normal left ventricular systolic and diastolic function.    She had improved sense of well-being.  With her clinical improvement, she was deemed ready to be discharged from the hospital as he did not have any further hospitalization needs.  Patient felt comfortable going home.  The discharge plan was discussed with the patient, with which she was agreeable to.    Therefore, she is discharged in good and stable condition to home with close outpatient follow-up.      Discharge Date  12/24/2018      FOLLOW UP ITEMS POST DISCHARGE  -I will continue her on lisinopril 10 mg daily.  She will need continued outpatient blood pressure monitoring, for further optimization of her blood pressure medications.  I counseled her to continue monitoring her  blood pressure at home with home blood pressure monitoring, and to follow-up with her PCP.  -I would also continue her on 3 more days of oral Omnicef for her UTI.    DISCHARGE DIAGNOSES  Principal Problem (Resolved):    Hypertensive urgency POA: Yes  Active Problems:    Migraines POA: Yes    UTI (urinary tract infection) POA: Yes    Hypokalemia POA: No    Essential hypertension POA: Yes      FOLLOW UP    Yeyo Bravo M.D.  [address]  [facility] [address]  [facility] [address]  [phone]    Schedule an appointment as soon as possible for a visit in 1 week  Hospital follow-up      MEDICATIONS ON DISCHARGE     Medication List      START taking these medications      Instructions   cefdinir 300 MG Caps  Commonly known as:  OMNICEF   Take 1 Cap by mouth 2 times a day for 3 days.  Dose:  300 mg     lisinopril 10 MG Tabs  Start taking on:  12/25/2018  Commonly known as:  PRINIVIL   Take 1 Tab by mouth every day.  Dose:  10 mg        CONTINUE taking these medications      Instructions   ALPRAZolam 0.5 MG Tabs  Commonly known as:  XANAX   Take 0.25-0.5 mg by mouth as needed for Anxiety.  Dose:  0.25-0.5 mg            Allergies  No Known Allergies    DIET  Orders Placed This Encounter   Procedures   • Diet Order Regular     Standing Status:   Standing     Number of Occurrences:   1     Order Specific Question:   Diet:     Answer:   Regular [1]       ACTIVITY  As tolerated.  Weight bearing as tolerated    CONSULTATIONS  none    PROCEDURES  none    LABORATORY  Lab Results   Component Value Date    SODIUM 135 12/24/2018    POTASSIUM 3.4 (L) 12/24/2018    CHLORIDE 105 12/24/2018    CO2 20 12/24/2018    GLUCOSE 104 (H) 12/24/2018    BUN 14 12/24/2018    CREATININE 0.75 12/24/2018        Lab Results   Component Value Date    WBC 10.8 12/24/2018    HEMOGLOBIN 14.1 12/24/2018    HEMATOCRIT 43.4 12/24/2018    PLATELETCT 322 12/24/2018        Total time of the discharge process = 36 minutes.

## 2018-12-24 NOTE — CARE PLAN
Problem: Pain Management  Goal: Pain level will decrease to patient's comfort goal  Outcome: PROGRESSING AS EXPECTED  Denies pain med needs. Resting and calm.     Problem: Respiratory:  Goal: Respiratory status will improve  Outcome: PROGRESSING AS EXPECTED  Pt on ra. Respirations equal and unlabored. No sob.

## 2018-12-25 LAB
BACTERIA UR CULT: ABNORMAL
BACTERIA UR CULT: ABNORMAL
SIGNIFICANT IND 70042: ABNORMAL
SITE SITE: ABNORMAL
SOURCE SOURCE: ABNORMAL

## 2019-01-31 LAB — EKG IMPRESSION: NORMAL

## 2021-09-21 ENCOUNTER — NON-PROVIDER VISIT (OUTPATIENT)
Dept: URGENT CARE | Facility: PHYSICIAN GROUP | Age: 51
End: 2021-09-21

## 2021-09-21 DIAGNOSIS — Z02.1 PRE-EMPLOYMENT DRUG SCREENING: ICD-10-CM

## 2021-09-21 LAB
AMP AMPHETAMINE: NORMAL
COC COCAINE: NORMAL
INT CON NEG: NORMAL
INT CON POS: NORMAL
MET METHAMPHETAMINES: NORMAL
OPI OPIATES: NORMAL
PCP PHENCYCLIDINE: NORMAL
POC DRUG COMMENT 753798-OCCUPATIONAL HEALTH: NEGATIVE
THC: NORMAL

## 2021-09-21 PROCEDURE — 80305 DRUG TEST PRSMV DIR OPT OBS: CPT | Performed by: PHYSICIAN ASSISTANT

## 2021-10-01 ENCOUNTER — NON-PROVIDER VISIT (OUTPATIENT)
Dept: URGENT CARE | Facility: PHYSICIAN GROUP | Age: 51
End: 2021-10-01

## 2021-10-01 DIAGNOSIS — Z02.1 PRE-EMPLOYMENT DRUG SCREENING: ICD-10-CM

## 2021-10-01 LAB
AMP AMPHETAMINE: NORMAL
COC COCAINE: NORMAL
INT CON NEG: NEGATIVE
INT CON POS: POSITIVE
MET METHAMPHETAMINES: NORMAL
OPI OPIATES: NORMAL
PCP PHENCYCLIDINE: NORMAL
POC DRUG COMMENT 753798-OCCUPATIONAL HEALTH: NEGATIVE
THC: NORMAL

## 2021-10-01 PROCEDURE — 80305 DRUG TEST PRSMV DIR OPT OBS: CPT | Performed by: NURSE PRACTITIONER

## 2022-02-07 ENCOUNTER — TELEPHONE (OUTPATIENT)
Dept: SCHEDULING | Facility: IMAGING CENTER | Age: 52
End: 2022-02-07

## 2022-02-17 ENCOUNTER — OFFICE VISIT (OUTPATIENT)
Dept: MEDICAL GROUP | Facility: IMAGING CENTER | Age: 52
End: 2022-02-17
Payer: COMMERCIAL

## 2022-02-17 VITALS
BODY MASS INDEX: 22.97 KG/M2 | SYSTOLIC BLOOD PRESSURE: 156 MMHG | OXYGEN SATURATION: 98 % | TEMPERATURE: 98.7 F | DIASTOLIC BLOOD PRESSURE: 94 MMHG | HEART RATE: 62 BPM | HEIGHT: 62 IN | WEIGHT: 124.8 LBS | RESPIRATION RATE: 14 BRPM

## 2022-02-17 DIAGNOSIS — F41.9 ANXIETY: ICD-10-CM

## 2022-02-17 DIAGNOSIS — Z13.6 SCREENING FOR CARDIOVASCULAR CONDITION: ICD-10-CM

## 2022-02-17 DIAGNOSIS — Z11.59 NEED FOR HEPATITIS C SCREENING TEST: ICD-10-CM

## 2022-02-17 DIAGNOSIS — Z76.89 ENCOUNTER TO ESTABLISH CARE WITH NEW DOCTOR: ICD-10-CM

## 2022-02-17 DIAGNOSIS — Z11.3 SCREENING EXAMINATION FOR SEXUALLY TRANSMITTED DISEASE: ICD-10-CM

## 2022-02-17 DIAGNOSIS — Z13.1 DIABETES MELLITUS SCREENING: ICD-10-CM

## 2022-02-17 DIAGNOSIS — I10 ESSENTIAL HYPERTENSION: ICD-10-CM

## 2022-02-17 DIAGNOSIS — Z13.31 DEPRESSION SCREENING: ICD-10-CM

## 2022-02-17 PROBLEM — N39.0 UTI (URINARY TRACT INFECTION): Status: RESOLVED | Noted: 2018-12-24 | Resolved: 2022-02-17

## 2022-02-17 PROCEDURE — 99204 OFFICE O/P NEW MOD 45 MIN: CPT | Performed by: CLINICAL NURSE SPECIALIST

## 2022-02-17 RX ORDER — LOSARTAN POTASSIUM 25 MG/1
25 TABLET ORAL
COMMUNITY
Start: 2021-12-01 | End: 2022-02-17 | Stop reason: SDUPTHER

## 2022-02-17 RX ORDER — LOSARTAN POTASSIUM 50 MG/1
50 TABLET ORAL
Qty: 30 TABLET | Refills: 0 | Status: SHIPPED | OUTPATIENT
Start: 2022-02-17 | End: 2022-03-11 | Stop reason: SDUPTHER

## 2022-02-17 ASSESSMENT — ANXIETY QUESTIONNAIRES
GAD7 TOTAL SCORE: 7
5. BEING SO RESTLESS THAT IT IS HARD TO SIT STILL: SEVERAL DAYS
3. WORRYING TOO MUCH ABOUT DIFFERENT THINGS: SEVERAL DAYS
1. FEELING NERVOUS, ANXIOUS, OR ON EDGE: MORE THAN HALF THE DAYS
2. NOT BEING ABLE TO STOP OR CONTROL WORRYING: SEVERAL DAYS
7. FEELING AFRAID AS IF SOMETHING AWFUL MIGHT HAPPEN: NOT AT ALL
6. BECOMING EASILY ANNOYED OR IRRITABLE: NOT AT ALL
4. TROUBLE RELAXING: MORE THAN HALF THE DAYS

## 2022-02-17 ASSESSMENT — PAIN SCALES - GENERAL: PAINLEVEL: NO PAIN

## 2022-02-17 ASSESSMENT — PATIENT HEALTH QUESTIONNAIRE - PHQ9: CLINICAL INTERPRETATION OF PHQ2 SCORE: 0

## 2022-02-17 NOTE — PROGRESS NOTES
"Subjective     Yolie Alivia Elliott is a 51 y.o. female who presents with Establish Care and Blood Pressure Problem (Previously on 100 mg losartan )            HPI  Essential hypertension  She believes she was on losartan 100mg but ran out when she stopped seeing her last provider.  [facility] 25 mg.  She increased to 50mg and ran out a month ago.  She takes BP at home and 170-180/110-120.  She has been hospitalized for systolic>200.  She started exercising more and lost weight although she reports a high salt diet.     Occasional headache with high BP relieved by Excedrin.  Occasional dizziness which can last 3 days and can cause vomiting. No chest pain or shortness of breath. No leg swelling but has had in the past.  She has reduced alcohol which helped the edema.     Anxiety  Chronic anxiety since childhood.  She did online therapy for about a month.  She has taken Xanax in the past as needed which helped.  She recently broke up with a boyfriend.  She generally feels fairly well overall.          ROS  See HPI      No Known Allergies    Current Outpatient Medications on File Prior to Visit   Medication Sig Dispense Refill   • Bioflavonoid Products (VITAMIN C) Chew Tab Chew.     • Multiple Vitamin (MULTI-VITAMIN PO) Take  by mouth.     • ALPRAZolam (XANAX) 0.5 MG Tab Take 0.25-0.5 mg by mouth as needed for Anxiety. (Patient not taking: Reported on 2/17/2022)       No current facility-administered medications on file prior to visit.           Objective     /94 (BP Location: Left arm, Patient Position: Sitting, BP Cuff Size: Small adult)   Pulse 62   Temp 37.1 °C (98.7 °F) (Temporal)   Resp 14   Ht 1.562 m (5' 1.5\")   Wt 56.6 kg (124 lb 12.8 oz)   LMP 09/21/2015 (Approximate)   SpO2 98%   BMI 23.20 kg/m²      Physical Exam  Constitutional:       General: She is not in acute distress.     Appearance: Normal appearance. She is not ill-appearing, toxic-appearing or " diaphoretic.   HENT:      Head: Normocephalic and atraumatic.   Eyes:      Pupils: Pupils are equal, round, and reactive to light.   Cardiovascular:      Rate and Rhythm: Normal rate and regular rhythm.      Heart sounds: Normal heart sounds.   Pulmonary:      Effort: Pulmonary effort is normal.      Breath sounds: Normal breath sounds.   Skin:     General: Skin is warm and dry.   Neurological:      Mental Status: She is alert and oriented to person, place, and time.      Gait: Gait normal.   Psychiatric:         Mood and Affect: Mood normal.         Behavior: Behavior normal.         Thought Content: Thought content normal.         Judgment: Judgment normal.                             Assessment & Plan   Yolie is establishing care today. She is interested in addressing hypertension only, not preventative care beyond labs.  PHQ-9 score 0.     1. Essential hypertension  Start losartan 50mg.  Labs ordered. Return to care for BP check in 1-2 weeks.    - TSH WITH REFLEX TO FT4; Future  - losartan (COZAAR) 50 MG Tab; Take 1 Tablet by mouth every day.  Dispense: 30 Tablet; Refill: 0    2. Screening for cardiovascular condition    - CBC WITH DIFFERENTIAL; Future  - Lipid Profile; Future  - VITAMIN D,25 HYDROXY; Future    3. Diabetes mellitus screening    - Comp Metabolic Panel; Future  - HEMOGLOBIN A1C; Future    4. Need for hepatitis C screening test    - HEP C VIRUS ANTIBODY; Future    5. Screening examination for sexually transmitted disease    - T.PALLIDUM AB EIA; Future  - HIV AG/AB COMBO ASSAY SCREENING; Future  - Chlamydia/GC PCR Urine Or Swab; Future    6. Anxiety  Continue to monitor.    Return in about 2 weeks (around 3/3/2022), or if symptoms worsen or fail to improve, for With test results.

## 2022-02-17 NOTE — ASSESSMENT & PLAN NOTE
She believes she was on losartan 100mg but ran out when she stopped seeing her last provider.  [facility] 25 mg.  She increased to 50mg and ran out a month ago.  She takes BP at home and 170-180/110-120.  She has been hospitalized for systolic>200.  She started exercising more and lost weight although she reports a high salt diet.     Occasional headache with high BP relieved by Excedrin.  Occasional dizziness which can last 3 days and can cause vomiting. No chest pain or shortness of breath. No leg swelling but has had in the past.  She has reduced alcohol which helped the edema.

## 2022-02-17 NOTE — ASSESSMENT & PLAN NOTE
Chronic anxiety since childhood.  She did online therapy for about a month.  She has taken Xanax in the past as needed which helped.  She recently broke up with a boyfriend.  She generally feels fairly well overall.

## 2022-03-11 ENCOUNTER — OFFICE VISIT (OUTPATIENT)
Dept: MEDICAL GROUP | Facility: IMAGING CENTER | Age: 52
End: 2022-03-11
Payer: COMMERCIAL

## 2022-03-11 VITALS
HEIGHT: 62 IN | WEIGHT: 123 LBS | SYSTOLIC BLOOD PRESSURE: 140 MMHG | BODY MASS INDEX: 22.63 KG/M2 | OXYGEN SATURATION: 97 % | DIASTOLIC BLOOD PRESSURE: 72 MMHG | HEART RATE: 78 BPM | TEMPERATURE: 98.2 F

## 2022-03-11 DIAGNOSIS — Z12.31 ENCOUNTER FOR SCREENING MAMMOGRAM FOR BREAST CANCER: ICD-10-CM

## 2022-03-11 DIAGNOSIS — F41.9 ANXIETY: ICD-10-CM

## 2022-03-11 DIAGNOSIS — I10 ESSENTIAL HYPERTENSION: ICD-10-CM

## 2022-03-11 PROCEDURE — 99214 OFFICE O/P EST MOD 30 MIN: CPT | Performed by: CLINICAL NURSE SPECIALIST

## 2022-03-11 RX ORDER — LOSARTAN POTASSIUM 100 MG/1
100 TABLET ORAL
Qty: 30 TABLET | Refills: 0 | Status: SHIPPED | OUTPATIENT
Start: 2022-03-11 | End: 2022-04-13

## 2022-03-11 ASSESSMENT — ANXIETY QUESTIONNAIRES
3. WORRYING TOO MUCH ABOUT DIFFERENT THINGS: NOT AT ALL
2. NOT BEING ABLE TO STOP OR CONTROL WORRYING: NOT AT ALL
7. FEELING AFRAID AS IF SOMETHING AWFUL MIGHT HAPPEN: NOT AT ALL
4. TROUBLE RELAXING: MORE THAN HALF THE DAYS
5. BEING SO RESTLESS THAT IT IS HARD TO SIT STILL: SEVERAL DAYS
6. BECOMING EASILY ANNOYED OR IRRITABLE: SEVERAL DAYS
GAD7 TOTAL SCORE: 5
1. FEELING NERVOUS, ANXIOUS, OR ON EDGE: SEVERAL DAYS

## 2022-03-11 ASSESSMENT — PAIN SCALES - GENERAL: PAINLEVEL: NO PAIN

## 2022-03-11 ASSESSMENT — PATIENT HEALTH QUESTIONNAIRE - PHQ9: CLINICAL INTERPRETATION OF PHQ2 SCORE: 0

## 2022-03-11 NOTE — ASSESSMENT & PLAN NOTE
Taking losartan 50mg. Home BP readings 161/97, 196/104, 171/102, 133/94.  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  She then took 2 days off and her BP decreased.

## 2022-03-11 NOTE — PROGRESS NOTES
"Subjective     Yolie Elliott is a 51 y.o. female who presents with Follow-Up, Lab Results, Medication Management, and Hypertension            HPI     Essential hypertension  Taking losartan 50mg. Home BP readings 161/97, 196/104, 171/102, 133/94.  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  She then took 2 days off and her BP decreased.      Anxiety  Unchanged but BP increases with increases with increased anxiety.        ROS  See HPI      No Known Allergies    Current Outpatient Medications on File Prior to Visit   Medication Sig Dispense Refill   • Bioflavonoid Products (VITAMIN C) Chew Tab Chew.     • Multiple Vitamin (MULTI-VITAMIN PO) Take  by mouth.     • ALPRAZolam (XANAX) 0.5 MG Tab Take 0.25-0.5 mg by mouth as needed for Anxiety.       No current facility-administered medications on file prior to visit.       KHUSHBU-7 Questionnaire    Feeling nervous, anxious, or on edge: Several days  Not being able to sop or control worrying: Not at all  Worrying too much about different things: Not at all  Trouble relaxing: More than half the days  Being so restless that it's hard to sit still: Several days  Becoming easily annoyed or irritable: Several days  Feeling afraid as if something awful might happen: Not at all  Total: 5    Interpretation of KHUSHBU 7 Total Score   Score Severity :  0-4 No Anxiety   5-9 Mild Anxiety  10-14 Moderate Anxiety  15-21 Severe Anxiety        Objective     /72 (BP Location: Left arm, Patient Position: Sitting, BP Cuff Size: Adult)   Pulse 78   Temp 36.8 °C (98.2 °F)   Ht 1.562 m (5' 1.5\")   Wt 55.8 kg (123 lb)   LMP 09/21/2015 (Approximate)   SpO2 97%   BMI 22.86 kg/m²      Physical Exam  Constitutional:       General: She is not in acute distress.     Appearance: Normal appearance. She is not ill-appearing, toxic-appearing or diaphoretic.   HENT:      Head: Normocephalic and atraumatic.   Eyes:      Pupils: Pupils are equal, " round, and reactive to light.   Cardiovascular:      Rate and Rhythm: Normal rate and regular rhythm.      Heart sounds: Normal heart sounds.   Pulmonary:      Effort: Pulmonary effort is normal.      Breath sounds: Normal breath sounds.   Skin:     General: Skin is warm and dry.   Neurological:      Mental Status: She is alert.      Gait: Gait normal.   Psychiatric:         Mood and Affect: Mood normal.         Behavior: Behavior normal.         Thought Content: Thought content normal.         Judgment: Judgment normal.                     Assessment & Plan        1. Essential hypertension  Increase dose from 50mg to 100mg.  Reassess in 1-2 weeks.    - losartan (COZAAR) 100 MG Tab; Take 1 Tablet by mouth every day.  Dispense: 30 Tablet; Refill: 0    2. Anxiety  Take magnesium Calm to help calm mind and relax muscles.     3. Encounter for screening mammogram for breast cancer    - MA-SCREENING MAMMO BILAT W/TOMOSYNTHESIS W/CAD; Future    She declined colon cancer screening and vaccines despite education on risks of not receiving these interventions.    Return if symptoms worsen or fail to improve, for f/u HTN.

## 2022-03-11 NOTE — LETTER
[facility]  [place] [id number] [place] [address]     March 11, 2022    Patient: Yolie Elliott   YOB: 1970   Date of Visit: 3/11/2022       To Whom It May Concern:    Yolie Elliott was seen and treated in our department on 3/11/2022. She had to leave work on 3/7/21 due to a medical condition and required 2 further days off to recover.  She is able to return to work without restrictions on 3/13.    Sincerely,     REJI Aleman.

## 2022-04-12 DIAGNOSIS — I10 ESSENTIAL HYPERTENSION: ICD-10-CM

## 2022-04-13 RX ORDER — LOSARTAN POTASSIUM 100 MG/1
100 TABLET ORAL
Qty: 30 TABLET | Refills: 0 | Status: SHIPPED | OUTPATIENT
Start: 2022-04-13 | End: 2022-05-23

## 2022-04-18 ENCOUNTER — TELEPHONE (OUTPATIENT)
Dept: MEDICAL GROUP | Facility: IMAGING CENTER | Age: 52
End: 2022-04-18
Payer: COMMERCIAL

## 2022-04-18 NOTE — TELEPHONE ENCOUNTER
Received message from patient requesting refill on medication.    Attempted to return patients call. Mailbox full and unable to leave voicemail.

## 2022-04-27 ENCOUNTER — TELEPHONE (OUTPATIENT)
Dept: MEDICAL GROUP | Facility: IMAGING CENTER | Age: 52
End: 2022-04-27
Payer: COMMERCIAL

## 2022-04-27 NOTE — TELEPHONE ENCOUNTER
----- Message from Amy Castellon sent at 4/27/2022  9:37 AM PDT -----  Regarding: Rx Refill/Urgent  Pt called stating that she is now out of her of BP medication. She cannot go another day without taking it. We got her scheduled for a Bp check next week. Please send Rx to the [facility] on [address] and contact patient.    Thank you!

## 2022-04-27 NOTE — TELEPHONE ENCOUNTER
Called pt to notify her that losartan was sent to [facility] on 4/13/22. Reminded pt to come to bp check as scheduled.

## 2022-05-22 DIAGNOSIS — I10 ESSENTIAL HYPERTENSION: ICD-10-CM

## 2022-05-23 RX ORDER — LOSARTAN POTASSIUM 100 MG/1
100 TABLET ORAL
Qty: 30 TABLET | Refills: 0 | Status: SHIPPED | OUTPATIENT
Start: 2022-05-23 | End: 2022-06-28

## 2022-06-28 DIAGNOSIS — I10 ESSENTIAL HYPERTENSION: ICD-10-CM

## 2022-06-28 RX ORDER — LOSARTAN POTASSIUM 100 MG/1
100 TABLET ORAL
Qty: 30 TABLET | Refills: 0 | Status: SHIPPED | OUTPATIENT
Start: 2022-06-28 | End: 2022-08-01

## 2022-06-28 NOTE — TELEPHONE ENCOUNTER
Received request via: Pharmacy    Was the patient seen in the last year in this department? Yes    Does the patient have an active prescription (recently filled or refills available) for medication(s) requested? No     Last visit 3/11/22

## 2022-06-28 NOTE — TELEPHONE ENCOUNTER
Yolie has not showed up for her scheduled blood pressure checks.  She has been notified multiple times. Refill placed for 30 days only as she needs to be reevaluated.

## 2022-07-31 DIAGNOSIS — I10 ESSENTIAL HYPERTENSION: ICD-10-CM

## 2022-08-01 RX ORDER — LOSARTAN POTASSIUM 100 MG/1
100 TABLET ORAL
Qty: 30 TABLET | Refills: 0 | Status: SHIPPED | OUTPATIENT
Start: 2022-08-01 | End: 2022-09-06

## 2022-09-03 DIAGNOSIS — I10 ESSENTIAL HYPERTENSION: ICD-10-CM

## 2022-09-06 RX ORDER — LOSARTAN POTASSIUM 100 MG/1
100 TABLET ORAL
Qty: 30 TABLET | Refills: 0 | Status: SHIPPED | OUTPATIENT
Start: 2022-09-06 | End: 2022-10-04

## 2022-09-30 DIAGNOSIS — I10 ESSENTIAL HYPERTENSION: ICD-10-CM

## 2022-10-04 RX ORDER — LOSARTAN POTASSIUM 100 MG/1
100 TABLET ORAL
Qty: 30 TABLET | Refills: 0 | Status: SHIPPED | OUTPATIENT
Start: 2022-10-04 | End: 2022-11-02

## 2022-10-28 DIAGNOSIS — I10 ESSENTIAL HYPERTENSION: ICD-10-CM

## 2022-11-02 RX ORDER — LOSARTAN POTASSIUM 100 MG/1
100 TABLET ORAL
Qty: 30 TABLET | Refills: 0 | Status: SHIPPED | OUTPATIENT
Start: 2022-11-02

## 2022-11-02 NOTE — TELEPHONE ENCOUNTER
Phone Number Called: [phone] (home)       Call outcome: Left detailed message for patient. Informed to call back with any additional questions.    Message: told pt to schedule a follow up appt to avoid delay in medication refill. Told her to call [phone] or schedule via [employer] if access is available.

## 2023-07-09 ENCOUNTER — OFFICE VISIT (OUTPATIENT)
Dept: URGENT CARE | Facility: PHYSICIAN GROUP | Age: 53
End: 2023-07-09
Payer: COMMERCIAL

## 2023-07-09 VITALS
BODY MASS INDEX: 21.35 KG/M2 | HEART RATE: 85 BPM | OXYGEN SATURATION: 98 % | WEIGHT: 116 LBS | HEIGHT: 62 IN | DIASTOLIC BLOOD PRESSURE: 100 MMHG | RESPIRATION RATE: 18 BRPM | SYSTOLIC BLOOD PRESSURE: 170 MMHG | TEMPERATURE: 97.4 F

## 2023-07-09 DIAGNOSIS — R42 VERTIGO: ICD-10-CM

## 2023-07-09 DIAGNOSIS — R82.90 ABNORMAL URINE ODOR: ICD-10-CM

## 2023-07-09 DIAGNOSIS — I10 PRIMARY HYPERTENSION: ICD-10-CM

## 2023-07-09 PROCEDURE — 3077F SYST BP >= 140 MM HG: CPT | Performed by: FAMILY MEDICINE

## 2023-07-09 PROCEDURE — 3080F DIAST BP >= 90 MM HG: CPT | Performed by: FAMILY MEDICINE

## 2023-07-09 PROCEDURE — 99214 OFFICE O/P EST MOD 30 MIN: CPT | Performed by: FAMILY MEDICINE

## 2023-07-09 RX ORDER — MECLIZINE HYDROCHLORIDE 25 MG/1
25 TABLET ORAL 3 TIMES DAILY PRN
Qty: 20 TABLET | Refills: 1 | Status: SHIPPED | OUTPATIENT
Start: 2023-07-09

## 2023-07-09 ASSESSMENT — ENCOUNTER SYMPTOMS
MYALGIAS: 0
EYE REDNESS: 0
WEIGHT LOSS: 0
DOUBLE VISION: 0
EYE DISCHARGE: 0

## 2023-07-09 NOTE — LETTER
July 9, 2023         Patient: Yolie Elliott   YOB: 1970   Date of Visit: 7/9/2023           To Whom it May Concern:    Yolie Elliott was seen in my clinic on 7/9/2023. Please excuse from work 7/9 and 7/10/2023. She may return sooner if symptoms resolve.       Sincerely,           Octavio Morrow M.D.  Electronically Signed

## 2023-07-09 NOTE — PROGRESS NOTES
"Subjective     Yolie Alivia Elliott is a 52 y.o. female who presents with Hypertension (Hypertension, dizzy, sob. Pt. States she missed a couple days of her Losartan. Pt. States her urine also smells bad.)            Months elevated blood pressure.  She has measured it at home as high as 219/118.  Has been prescribed losartan in the past does not take it.  She has 2 bottles at home.  There is a strong family history of hypertension. No PMH cardiac. No CP. EtOH twice weekly/1 drink. She has had years of vertigo that occas  lasts a few days at a time. Vertigo is not always positional. Associated N/V. Intermittent vision changes right eye/no current symptoms. Mild HA.  No other aggravating or alleviating factors. Notes foul swelling urine. No fever. No other aggravating or alleviating factors.            Review of Systems   Constitutional:  Negative for malaise/fatigue and weight loss.   Eyes:  Negative for double vision, discharge and redness.   Musculoskeletal:  Negative for joint pain and myalgias.   Skin:  Negative for itching and rash.              Objective     BP (!) 170/100 (BP Location: Left arm, Patient Position: Sitting, BP Cuff Size: Adult)   Pulse 85   Temp 36.3 °C (97.4 °F) (Temporal)   Resp 18   Ht 1.562 m (5' 1.5\")   Wt 52.6 kg (116 lb)   LMP 09/21/2015 (Approximate)   SpO2 98%   BMI 21.56 kg/m²      Physical Exam  Constitutional:       General: She is not in acute distress.     Appearance: She is well-developed.   HENT:      Head: Normocephalic and atraumatic.      Right Ear: Tympanic membrane normal.      Left Ear: Tympanic membrane normal.   Eyes:      Extraocular Movements: Extraocular movements intact.      Conjunctiva/sclera: Conjunctivae normal.      Pupils: Pupils are equal, round, and reactive to light.   Cardiovascular:      Rate and Rhythm: Normal rate and regular rhythm.      Heart sounds: Normal heart sounds. No murmur heard.  Pulmonary:      Effort: Pulmonary effort is normal.    "   Breath sounds: Normal breath sounds. No wheezing.   Skin:     General: Skin is warm and dry.      Findings: No rash.   Neurological:      General: No focal deficit present.      Mental Status: She is alert and oriented to person, place, and time.      Comments: Equivocal [place]                               Assessment & Plan        1. Abnormal urine odor  POCT Urinalysis      2. Vertigo  meclizine (ANTIVERT) 25 MG Tab    Referral to establish with [facility]    CANCELED: Referral to establish with [facility]      3. Primary hypertension  Referral to establish with [facility]    CANCELED: Referral to establish with [facility]        Differential diagnosis, natural history, supportive care, and indications for immediate follow-up were discussed.     UA pending    Chronic recurrent vertigo with possible visual symptoms concerning for possible central etiology.  Discussed she department evaluation today and she declines.  Recommended that she restart her losartan and follow-up with primary care as soon as possible wtih a BP log.

## 2023-07-19 ENCOUNTER — TELEPHONE (OUTPATIENT)
Dept: HEALTH INFORMATION MANAGEMENT | Facility: OTHER | Age: 53
End: 2023-07-19
Payer: COMMERCIAL

## 2023-11-28 ENCOUNTER — APPOINTMENT (OUTPATIENT)
Dept: URGENT CARE | Facility: PHYSICIAN GROUP | Age: 53
End: 2023-11-28